# Patient Record
Sex: MALE | Race: WHITE | HISPANIC OR LATINO | URBAN - METROPOLITAN AREA
[De-identification: names, ages, dates, MRNs, and addresses within clinical notes are randomized per-mention and may not be internally consistent; named-entity substitution may affect disease eponyms.]

---

## 2018-03-20 ENCOUNTER — TELEPHONE (OUTPATIENT)
Dept: UROLOGY | Facility: MEDICAL CENTER | Age: 58
End: 2018-03-20

## 2018-03-20 NOTE — TELEPHONE ENCOUNTER
An Auto-fax Refill Request for Tamsulosin 0 4mg Si cap PO BID #180 was received from [facility] [place] #[id number]  Patient was last seen in  by Dr Jennifer Gillespie  Patient overdue for yearly exam   I attempted to call the patient a his home phone/cell phone and the line was busy x3  Will try again later

## 2018-03-21 NOTE — TELEPHONE ENCOUNTER
Home phone/cell phone continues to be busy  I called three times on 3/20/18 and just now  Will investigate to find a better number

## 2018-03-28 NOTE — TELEPHONE ENCOUNTER
Phone continues to be busy  Investigated UroChart from [facility]'s previous EMR system, found Medical Records Release back from March, 2017 to Dr Thelma Alvarez in [place], [place]  Apparently patient transferred to this practice  [facility] advised to call them for further refills  No additional action required

## 2019-05-13 RX ORDER — TAMSULOSIN HYDROCHLORIDE 0.4 MG/1
CAPSULE ORAL
Qty: 60 CAPSULE | Refills: 11 | OUTPATIENT
Start: 2019-05-13

## 2023-06-01 ENCOUNTER — TELEPHONE (OUTPATIENT)
Dept: OTHER | Facility: OTHER | Age: 63
End: 2023-06-01

## 2023-06-02 ENCOUNTER — TELEPHONE (OUTPATIENT)
Dept: GASTROENTEROLOGY | Facility: CLINIC | Age: 63
End: 2023-06-02

## 2023-06-02 NOTE — TELEPHONE ENCOUNTER
06/02/23  Screened by: Jamilah Claire    Referring Provider     Pre- Screening: There is no height or weight on file to calculate BMI  Has patient been referred for a routine screening Colonoscopy? yes  Is the patient between 39-70 years old? yes      Previous Colonoscopy yes   If yes:    Date: 08/27/2018    Facility: [facility]    Reason: history colon polyps      SCHEDULING STAFF: If the patient is between 45yrs-49yrs, please advise patient to confirm benefits/coverage with their insurance company for a routine screening colonoscopy, some insurance carriers will only cover at [facility] or older  If the patient is over 66years old, please schedule an office visit  Does the patient want to see a Gastroenterologist prior to their procedure OR are they having any GI symptoms? no    Has the patient been hospitalized or had abdominal surgery in the past 6 months? no    Does the patient use supplemental oxygen? no    Does the patient take Coumadin, Lovenox, Plavix, Elliquis, Xarelto, or other blood thinning medication? no    Has the patient had a stroke, cardiac event, or stent placed in the past year? no    SCHEDULING STAFF: If patient answers NO to above questions, then schedule procedure  If patient answers YES to above questions, then schedule office appointment  If patient is between 45yrs - 49yrs, please advise patient that we will have to confirm benefits & coverage with their insurance company for a routine screening colonoscopy

## 2023-06-02 NOTE — TELEPHONE ENCOUNTER
Scheduled date of colonoscopy (as of today):08/29/2023  Physician performing colonoscopy:Dr Avi Harrison  Location of colonoscopy:[facility]  Bowel prep: [email or web address]  Instructions mailed to patient by: bruce  Clearances: none

## 2023-06-02 NOTE — TELEPHONE ENCOUNTER
Scheduled date of colonoscopy (as of today):08/29/2023  Physician performing colonoscopy:Dr Ady Hairston  Location of colonoscopy:[facility]  Bowel prep: golytely  Instructions mailed to patient by: bruce  Clearances: none

## 2023-07-14 ENCOUNTER — TELEPHONE (OUTPATIENT)
Dept: GASTROENTEROLOGY | Facility: CLINIC | Age: 63
End: 2023-07-14

## 2023-07-14 DIAGNOSIS — Z12.11 SCREENING FOR COLON CANCER: ICD-10-CM

## 2023-07-14 DIAGNOSIS — Z86.010 HISTORY OF COLON POLYPS: Primary | ICD-10-CM

## 2023-08-17 ENCOUNTER — TELEPHONE (OUTPATIENT)
Dept: GASTROENTEROLOGY | Facility: CLINIC | Age: 63
End: 2023-08-17

## 2023-08-17 DIAGNOSIS — Z12.11 SCREENING FOR COLON CANCER: Primary | ICD-10-CM

## 2023-08-17 NOTE — TELEPHONE ENCOUNTER
Procedure confirmed  Colonoscopy     Via: Spoke with patient. Instructions given: Mail     Prep Given: Golytely    Call the office if there are any questions.

## 2025-08-04 ENCOUNTER — APPOINTMENT (OUTPATIENT)
Age: 65
End: 2025-08-04
Payer: COMMERCIAL

## 2025-08-04 DIAGNOSIS — Z79.891 ENCOUNTER FOR LONG-TERM METHADONE USE: ICD-10-CM

## 2025-08-04 PROCEDURE — 80355 GABAPENTIN NON-BLOOD: CPT

## 2025-08-04 PROCEDURE — 80361 OPIATES 1 OR MORE: CPT

## 2025-08-04 PROCEDURE — 80366 DRUG SCREENING PREGABALIN: CPT

## 2025-08-04 PROCEDURE — 80307 DRUG TEST PRSMV CHEM ANLYZR: CPT

## 2025-08-04 PROCEDURE — 80365 DRUG SCREENING OXYCODONE: CPT

## 2025-08-07 LAB
6-ACETYLMORPHINE IA: NEGATIVE NG/ML
ACCEPTABLE CREAT UR QL: 26 MG/DL
AMPHET UR QL SCN: NEGATIVE NG/ML
ANTICONVULSANTS: ABNORMAL
BARBITURATES UR QL SCN: NEGATIVE NG/ML
BENZODIAZ UR QL SCN: NEGATIVE NG/ML
BUPRENORPHINE UR QL CFM: NEGATIVE NG/ML
CANNABINOIDS UR QL SCN: NEGATIVE NG/ML
CARISOPRODOL UR QL: NEGATIVE NG/ML
COCAINE+BZE UR QL SCN: NEGATIVE NG/ML
CODEINE UR QL CFM: NOT DETECTED NG/MG CREAT
DHC UR CFM-MCNC: NOT DETECTED NG/MG CREAT
ETHYL GLUCURONIDE UR QL SCN: NEGATIVE NG/ML
FENTANYL UR QL SCN: NEGATIVE NG/ML
GABAPENTIN SERPLBLD QL SCN: NORMAL UG/ML
GABAPENTIN UR QL CFM: PRESENT
HYDROCODONE UR QL CFM: NOT DETECTED NG/MG CREAT
HYDROMORPHONE UR QL CFM: NOT DETECTED NG/MG CREAT
METHADONE UR QL SCN: NEGATIVE NG/ML
MORPHINE UR QL CFM: ABNORMAL NG/MG CREAT
NITRITE UR QL STRIP: NEGATIVE UG/ML
NORCODEINE/CREAT UR CFM: NOT DETECTED NG/MG CREAT
NORHYDROCODONE UR CFM-MCNC: NOT DETECTED NG/MG CREAT
NORMORPHINE: 2558 NG/MG CREAT
NOROXYCODONE UR CFM-MCNC: 2800 NG/MG CREAT
NOROXYMORPHONE/CREAT UR CFM: 1931 NG/MG CREAT
OPIATE CLASS: ABNORMAL
OPIATES UR QL SCN: NORMAL NG/ML
OXYCODONE CLASS: ABNORMAL
OXYCODONE UR QL CFM: 1388 NG/MG CREAT
OXYCODONE+OXYMORPHONE UR QL SCN: NORMAL NG/ML
OXYMORPHONE UR QL CFM: 6035 NG/MG CREAT
PCP UR QL SCN: NEGATIVE NG/ML
PREGABALIN UR QL CFM: NOT DETECTED
PROPOXYPH UR QL SCN: NEGATIVE NG/ML
SPECIMEN PH ACCEPTABLE UR: 8.4 (ref 4.5–8.9)
TAPENTADOL UR QL SCN: NEGATIVE NG/ML
TRAMADOL UR QL SCN: NEGATIVE NG/ML